# Patient Record
Sex: MALE | Race: WHITE | NOT HISPANIC OR LATINO | Employment: FULL TIME | URBAN - METROPOLITAN AREA
[De-identification: names, ages, dates, MRNs, and addresses within clinical notes are randomized per-mention and may not be internally consistent; named-entity substitution may affect disease eponyms.]

---

## 2023-02-15 ENCOUNTER — HOSPITAL ENCOUNTER (EMERGENCY)
Facility: HOSPITAL | Age: 53
Discharge: HOME | End: 2023-02-15
Attending: EMERGENCY MEDICINE | Admitting: EMERGENCY MEDICINE

## 2023-02-15 ENCOUNTER — APPOINTMENT (OUTPATIENT)
Dept: GENERAL RADIOLOGY | Facility: HOSPITAL | Age: 53
End: 2023-02-15

## 2023-02-15 VITALS
WEIGHT: 210 LBS | BODY MASS INDEX: 28.44 KG/M2 | SYSTOLIC BLOOD PRESSURE: 128 MMHG | RESPIRATION RATE: 16 BRPM | DIASTOLIC BLOOD PRESSURE: 76 MMHG | TEMPERATURE: 98.7 F | OXYGEN SATURATION: 94 % | HEART RATE: 65 BPM | HEIGHT: 72 IN

## 2023-02-15 DIAGNOSIS — R07.9 ACUTE CHEST PAIN: Primary | ICD-10-CM

## 2023-02-15 DIAGNOSIS — Z79.890 LONG-TERM CURRENT USE OF TESTOSTERONE REPLACEMENT THERAPY: ICD-10-CM

## 2023-02-15 DIAGNOSIS — R42 LIGHTHEADEDNESS: ICD-10-CM

## 2023-02-15 DIAGNOSIS — F17.200 SMOKER: ICD-10-CM

## 2023-02-15 LAB
ALBUMIN SERPL-MCNC: 4.8 G/DL (ref 3.5–5.2)
ALBUMIN/GLOB SERPL: 2.5 G/DL
ALP SERPL-CCNC: 78 U/L (ref 39–117)
ALT SERPL W P-5'-P-CCNC: 15 U/L (ref 1–41)
ANION GAP SERPL CALCULATED.3IONS-SCNC: 9 MMOL/L (ref 5–15)
AST SERPL-CCNC: 21 U/L (ref 1–40)
BASOPHILS # BLD AUTO: 0.03 10*3/MM3 (ref 0–0.2)
BASOPHILS NFR BLD AUTO: 0.3 % (ref 0–1.5)
BILIRUB SERPL-MCNC: 0.4 MG/DL (ref 0–1.2)
BILIRUB UR QL STRIP: NEGATIVE
BUN SERPL-MCNC: 16 MG/DL (ref 6–20)
BUN/CREAT SERPL: 21.9 (ref 7–25)
CALCIUM SPEC-SCNC: 9.5 MG/DL (ref 8.6–10.5)
CHLORIDE SERPL-SCNC: 105 MMOL/L (ref 98–107)
CLARITY UR: CLEAR
CO2 SERPL-SCNC: 27 MMOL/L (ref 22–29)
COLOR UR: YELLOW
CREAT SERPL-MCNC: 0.73 MG/DL (ref 0.76–1.27)
DEPRECATED RDW RBC AUTO: 41.7 FL (ref 37–54)
EGFRCR SERPLBLD CKD-EPI 2021: 108.8 ML/MIN/1.73
EOSINOPHIL # BLD AUTO: 0.11 10*3/MM3 (ref 0–0.4)
EOSINOPHIL NFR BLD AUTO: 1.2 % (ref 0.3–6.2)
ERYTHROCYTE [DISTWIDTH] IN BLOOD BY AUTOMATED COUNT: 12.8 % (ref 12.3–15.4)
GEN 5 2HR TROPONIN T REFLEX: 7 NG/L
GLOBULIN UR ELPH-MCNC: 1.9 GM/DL
GLUCOSE SERPL-MCNC: 77 MG/DL (ref 65–99)
GLUCOSE UR STRIP-MCNC: NEGATIVE MG/DL
HCT VFR BLD AUTO: 45 % (ref 37.5–51)
HGB BLD-MCNC: 15.4 G/DL (ref 13–17.7)
HGB UR QL STRIP.AUTO: NEGATIVE
HOLD SPECIMEN: NORMAL
IMM GRANULOCYTES # BLD AUTO: 0.04 10*3/MM3 (ref 0–0.05)
IMM GRANULOCYTES NFR BLD AUTO: 0.4 % (ref 0–0.5)
KETONES UR QL STRIP: NEGATIVE
LEUKOCYTE ESTERASE UR QL STRIP.AUTO: NEGATIVE
LYMPHOCYTES # BLD AUTO: 1.72 10*3/MM3 (ref 0.7–3.1)
LYMPHOCYTES NFR BLD AUTO: 18.7 % (ref 19.6–45.3)
MAGNESIUM SERPL-MCNC: 2.2 MG/DL (ref 1.6–2.6)
MCH RBC QN AUTO: 30.9 PG (ref 26.6–33)
MCHC RBC AUTO-ENTMCNC: 34.2 G/DL (ref 31.5–35.7)
MCV RBC AUTO: 90.2 FL (ref 79–97)
MONOCYTES # BLD AUTO: 0.82 10*3/MM3 (ref 0.1–0.9)
MONOCYTES NFR BLD AUTO: 8.9 % (ref 5–12)
NEUTROPHILS NFR BLD AUTO: 6.46 10*3/MM3 (ref 1.7–7)
NEUTROPHILS NFR BLD AUTO: 70.5 % (ref 42.7–76)
NITRITE UR QL STRIP: NEGATIVE
NRBC BLD AUTO-RTO: 0 /100 WBC (ref 0–0.2)
PH UR STRIP.AUTO: 7.5 [PH] (ref 5–8)
PLATELET # BLD AUTO: 250 10*3/MM3 (ref 140–450)
PMV BLD AUTO: 9.8 FL (ref 6–12)
POTASSIUM SERPL-SCNC: 4.4 MMOL/L (ref 3.5–5.2)
PROT SERPL-MCNC: 6.7 G/DL (ref 6–8.5)
PROT UR QL STRIP: NEGATIVE
QT INTERVAL: 334 MS
QT INTERVAL: 376 MS
QTC INTERVAL: 391 MS
QTC INTERVAL: 397 MS
RBC # BLD AUTO: 4.99 10*6/MM3 (ref 4.14–5.8)
SODIUM SERPL-SCNC: 141 MMOL/L (ref 136–145)
SP GR UR STRIP: 1.01 (ref 1–1.03)
TROPONIN T DELTA: -5 NG/L
TROPONIN T SERPL HS-MCNC: 12 NG/L
UROBILINOGEN UR QL STRIP: NORMAL
WBC NRBC COR # BLD: 9.18 10*3/MM3 (ref 3.4–10.8)
WHOLE BLOOD HOLD COAG: NORMAL
WHOLE BLOOD HOLD SPECIMEN: NORMAL

## 2023-02-15 PROCEDURE — 80053 COMPREHEN METABOLIC PANEL: CPT | Performed by: EMERGENCY MEDICINE

## 2023-02-15 PROCEDURE — 99284 EMERGENCY DEPT VISIT MOD MDM: CPT

## 2023-02-15 PROCEDURE — 83735 ASSAY OF MAGNESIUM: CPT | Performed by: EMERGENCY MEDICINE

## 2023-02-15 PROCEDURE — 93005 ELECTROCARDIOGRAM TRACING: CPT

## 2023-02-15 PROCEDURE — 81003 URINALYSIS AUTO W/O SCOPE: CPT | Performed by: EMERGENCY MEDICINE

## 2023-02-15 PROCEDURE — 93005 ELECTROCARDIOGRAM TRACING: CPT | Performed by: EMERGENCY MEDICINE

## 2023-02-15 PROCEDURE — 36415 COLL VENOUS BLD VENIPUNCTURE: CPT

## 2023-02-15 PROCEDURE — 71045 X-RAY EXAM CHEST 1 VIEW: CPT

## 2023-02-15 PROCEDURE — 85025 COMPLETE CBC W/AUTO DIFF WBC: CPT | Performed by: EMERGENCY MEDICINE

## 2023-02-15 PROCEDURE — 84484 ASSAY OF TROPONIN QUANT: CPT | Performed by: EMERGENCY MEDICINE

## 2023-02-15 RX ORDER — SODIUM CHLORIDE 0.9 % (FLUSH) 0.9 %
10 SYRINGE (ML) INJECTION AS NEEDED
Status: DISCONTINUED | OUTPATIENT
Start: 2023-02-15 | End: 2023-02-15 | Stop reason: HOSPADM

## 2023-02-15 NOTE — ED PROVIDER NOTES
[place]    EMERGENCY DEPARTMENT ENCOUNTER      Pt Name: Karen Reza  MRN: [id number]  YOB: 1970  Date of evaluation: 2/15/2023  Provider: Jah Stone MD    CHIEF COMPLAINT       Chief Complaint   Patient presents with   • Chest Pain   • Dizziness         HISTORY OF PRESENT ILLNESS   Karen Reza is a 52 y.o. male who presents to the emergency department with intermittent nonradiating substernal burning without any nausea, vomiting, or diaphoresis over the course the past couple days.  Patient did feel lightheaded at 1 point yesterday while he was at work.  He denies any shortness of breath, cough, fever, chills, abdominal pain, vomiting, or diarrhea.  He has no significant history of cardiovascular disease.  He is on TRT and had some lab work performed at their clinic that did show some high triglycerides and abnormal lipid profile.  He has no prior history of PE.      Nursing notes were reviewed.    REVIEW OF SYSTEMS     ROS:  A chief complaint appropriate review of systems was completed and is negative except as noted in the HPI.      PAST MEDICAL HISTORY   History reviewed. No pertinent past medical history.      SURGICAL HISTORY     History reviewed. No pertinent surgical history.      CURRENT MEDICATIONS       Current Facility-Administered Medications:   •  sodium chloride 0.9 % flush 10 mL, 10 mL, Intravenous, PRN, Jah Stone MD    Current Outpatient Medications:   •  TESTOSTERONE CYPIONATE IJ, Inject 0.5 mL as directed Every 3 (Three) Days., Disp: , Rfl:     ALLERGIES     Patient has no known allergies.    FAMILY HISTORY     History reviewed. No pertinent family history.       SOCIAL HISTORY       Social History     Socioeconomic History   • Marital status: Single   Tobacco Use   • Smoking status: Every Day     Types: Cigarettes   • Smokeless tobacco: Never   Vaping Use   • Vaping Use: Never used   Substance and Sexual Activity   • Alcohol use: Not Currently   • Drug use: Defer  "  • Sexual activity: Defer         PHYSICAL EXAM    (up to 7 for level 4, 8 or more for level 5)     Vitals:    02/15/23 1127 02/15/23 1203 02/15/23 1230   BP: 147/84 165/97 143/76   BP Location: Left arm  Left arm   Patient Position: Sitting  Sitting   Pulse: 87 92 71   Resp: 16     Temp: 98.7 °F (37.1 °C)     TempSrc: Oral     SpO2: 95% 95% 95%   Weight: 95.3 kg (210 lb)     Height: 182.9 cm (72\")         General: Awake, alert, no acute distress.  HEENT: Conjunctivae normal.  Neck: Trachea midline.  Cardiac: Heart regular rate, rhythm, no murmurs, rubs, or gallops  Lungs: Lungs are clear to auscultation, there is no wheezing, rhonchi, or rales. There is no use of accessory muscles.  Chest wall: There is no tenderness to palpation over the chest wall or over ribs  Abdomen: Abdomen is soft, nontender, nondistended. There are no firm or pulsatile masses, no rebound rigidity or guarding.   Musculoskeletal: No deformity.  Neuro: Alert and oriented x 4.  Dermatology: Skin is warm and dry  Psych: Mentation is grossly normal, cognition is grossly normal. Affect is appropriate.        DIAGNOSTIC RESULTS     EKG: All EKGs are interpreted by the Emergency Department Physician who either signs or Co-signs this chart in the absence of a cardiologist.    ECG 12 Lead Chest Pain   Final Result   Test Reason : Chest Pain   Blood Pressure :   */*   mmHG   Vent. Rate :  65 BPM     Atrial Rate :  65 BPM      P-R Int : 138 ms          QRS Dur :  88 ms       QT Int : 376 ms       P-R-T Axes :  60  49  23 degrees      QTc Int : 391 ms      Normal sinus rhythm   Normal ECG   When compared with ECG of 15-FEB-2023 11:33,   No significant change was found   Confirmed by PHI RODRIGEZ ([id number]) on 2/15/2023 2:47:54 PM      Referred By: EDMD           Confirmed By: PHI RODRIGEZ      ECG 12 Lead ED Triage Standing Order; Weak / Dizzy / AMS   Final Result   Test Reason : ED Triage Standing Order~   Blood Pressure :   */*   mmHG   Vent. Rate :  " 85 BPM     Atrial Rate :  85 BPM      P-R Int : 132 ms          QRS Dur :  90 ms       QT Int : 334 ms       P-R-T Axes :  70  65  24 degrees      QTc Int : 397 ms      Normal sinus rhythm   Normal ECG   No previous ECGs available   Confirmed by PHI RODRIGEZ ([id number]) on 2/15/2023 11:38:51 AM      Referred By: DALIA           Confirmed By: PHI RODRIGEZ            RADIOLOGY:   [x] Radiologist's Report Reviewed:  XR Chest 1 View   Final Result   Impression:   No acute cardiopulmonary process. A questionable nodule is present within the right lower lobe. CT of the chest is recommended for further characterization.      Electronically Signed: Lorna Birmingham     2/15/2023 12:03 PM EST     Workstation ID: [id number]          I ordered and independently reviewed the above noted radiographic studies.        LABS:    I have reviewed and interpreted all of the currently available lab results from this visit (if applicable):  Results for orders placed or performed during the hospital encounter of 02/15/23   Comprehensive Metabolic Panel    Specimen: Blood   Result Value Ref Range    Glucose 77 65 - 99 mg/dL    BUN 16 6 - 20 mg/dL    Creatinine 0.73 (L) 0.76 - 1.27 mg/dL    Sodium 141 136 - 145 mmol/L    Potassium 4.4 3.5 - 5.2 mmol/L    Chloride 105 98 - 107 mmol/L    CO2 27.0 22.0 - 29.0 mmol/L    Calcium 9.5 8.6 - 10.5 mg/dL    Total Protein 6.7 6.0 - 8.5 g/dL    Albumin 4.8 3.5 - 5.2 g/dL    ALT (SGPT) 15 1 - 41 U/L    AST (SGOT) 21 1 - 40 U/L    Alkaline Phosphatase 78 39 - 117 U/L    Total Bilirubin 0.4 0.0 - 1.2 mg/dL    Globulin 1.9 gm/dL    A/G Ratio 2.5 g/dL    BUN/Creatinine Ratio 21.9 7.0 - 25.0    Anion Gap 9.0 5.0 - 15.0 mmol/L    eGFR 108.8 >60.0 mL/min/1.73   High Sensitivity Troponin T    Specimen: Blood   Result Value Ref Range    HS Troponin T 12 <15 ng/L   Magnesium    Specimen: Blood   Result Value Ref Range    Magnesium 2.2 1.6 - 2.6 mg/dL   Urinalysis With Microscopic If Indicated (No Culture) - Urine, Clean  Catch    Specimen: Urine, Clean Catch   Result Value Ref Range    Color, UA Yellow Yellow, Straw    Appearance, UA Clear Clear    pH, UA 7.5 5.0 - 8.0    Specific Gravity, UA 1.011 1.001 - 1.030    Glucose, UA Negative Negative    Ketones, UA Negative Negative    Bilirubin, UA Negative Negative    Blood, UA Negative Negative    Protein, UA Negative Negative    Leuk Esterase, UA Negative Negative    Nitrite, UA Negative Negative    Urobilinogen, UA 0.2 E.U./dL 0.2 - 1.0 E.U./dL   CBC Auto Differential    Specimen: Blood   Result Value Ref Range    WBC 9.18 3.40 - 10.80 10*3/mm3    RBC 4.99 4.14 - 5.80 10*6/mm3    Hemoglobin 15.4 13.0 - 17.7 g/dL    Hematocrit 45.0 37.5 - 51.0 %    MCV 90.2 79.0 - 97.0 fL    MCH 30.9 26.6 - 33.0 pg    MCHC 34.2 31.5 - 35.7 g/dL    RDW 12.8 12.3 - 15.4 %    RDW-SD 41.7 37.0 - 54.0 fl    MPV 9.8 6.0 - 12.0 fL    Platelets 250 140 - 450 10*3/mm3    Neutrophil % 70.5 42.7 - 76.0 %    Lymphocyte % 18.7 (L) 19.6 - 45.3 %    Monocyte % 8.9 5.0 - 12.0 %    Eosinophil % 1.2 0.3 - 6.2 %    Basophil % 0.3 0.0 - 1.5 %    Immature Grans % 0.4 0.0 - 0.5 %    Neutrophils, Absolute 6.46 1.70 - 7.00 10*3/mm3    Lymphocytes, Absolute 1.72 0.70 - 3.10 10*3/mm3    Monocytes, Absolute 0.82 0.10 - 0.90 10*3/mm3    Eosinophils, Absolute 0.11 0.00 - 0.40 10*3/mm3    Basophils, Absolute 0.03 0.00 - 0.20 10*3/mm3    Immature Grans, Absolute 0.04 0.00 - 0.05 10*3/mm3    nRBC 0.0 0.0 - 0.2 /100 WBC   High Sensitivity Troponin T 2Hr    Specimen: Blood   Result Value Ref Range    HS Troponin T 7 <15 ng/L    Troponin T Delta -5 (L) >=-4 - <+4 ng/L   ECG 12 Lead ED Triage Standing Order; Weak / Dizzy / AMS   Result Value Ref Range    QT Interval 334 ms    QTC Interval 397 ms   ECG 12 Lead Chest Pain   Result Value Ref Range    QT Interval 376 ms    QTC Interval 391 ms   Green Top (Gel)   Result Value Ref Range    Extra Tube Hold for add-ons.    Lavender Top   Result Value Ref Range    Extra Tube hold for add-on     Gold Top - SST   Result Value Ref Range    Extra Tube Hold for add-ons.    Light Blue Top   Result Value Ref Range    Extra Tube Hold for add-ons.         If labs were ordered, I independently reviewed the results and considered them in treating the patient.      EMERGENCY DEPARTMENT COURSE and DIFFERENTIAL DIAGNOSIS/MDM:   Vitals:  AS OF 15:02 EST    BP - 143/76  HR - 71  TEMP - 98.7 °F (37.1 °C) (Oral)  O2 SATS - 95%        Discussion below represents my analysis of pertinent findings related to patient's condition, differential diagnosis, treatment plan and final disposition.      Differential diagnosis:  The differential diagnosis associated with the patient's presentation includes: Unstable angina, non-STEMI, nonspecific chest pain thorax, PE, dissection, anemia, electrolyte derangement       Independent interpretations (ECG/rhythm strip/X-ray/US/CT scan): independently reviewed the patient's cardiac monitoring which demonstrates sinus rhythm without evidence of dysrhythmia.  I independently reviewed the patient's chest x-ray which demonstrates no evidence of pulmonary infiltrate or pneumothorax.      Additional sources:  Discussed/obtained information from independent historians:   [] Spouse:   [] Parent:   [] Friend:   [] EMS:   [] Other:  External (non-ED) record review:   [] Inpatient record:   [] Office record:   [] Outpatient record:   [x] Prior Outpatient labs: I reviewed labs provided to me the patient from his TRT clinic.  Evidence of hypertriglyceridemia and low HDL cholesterol.   [] Prior Outpatient radiology:   [] Primary Care record:   [] Outside ED record:   [] Other:       Patient's care impacted by:   [] Diabetes   [] Hypertension   [] Coronary Artery Disease   [] Cancer   [x] Other: Smoker    Care significantly affected by Social Determinants of Health (housing and economic circumstances, unemployment)    [] Yes     [x] No   If yes, Patient's care significantly limited by  Social Determinants  of Health including:    [] Inadequate housing    [] Low income    [] Alcoholism and drug addiction in family    [] Problems related to primary support group    [] Unemployment    [] Problems related to employment    [] Other Social Determinants of Health:       Consideration of admission/observation vs discharge: I did consider admission for this patient, however he has been asymptomatic during the course of his stay in the ED.  He is also low risk in regards to this chest discomfort and there is no evidence of emergent pathology contributing to his symptoms.  Given this, feel he is most appropriate discharge home with close outpatient follow-up.    HEART Score for Major Cardiac Events from [email or web address]  on 2/15/2023  ** All calculations should be rechecked by clinician prior to use **    RESULT SUMMARY:  2 points  Low Score (0-3 points)    Risk of MACE of 0.9-1.7%.      INPUTS:  History --> 0 = Slightly suspicious  EKG --> 0 = Normal  Age --> 1 = 45-64  Risk factors --> 1 = 1-2 risk factors  Initial troponin --> 0 = ?normal limit      Additional orders considered but not ordered:  The following testing was considered but ultimately not selected after discussion with patient/family: I considered ordering D-dimer or CTA to rule PE, however the patient has no ongoing symptoms at this time and has not had any dyspnea.  Along with this, there is no tachycardia or hypoxia and feel that the risk for this was very low.    ED Course:    ED Course as of 02/15/23 1502   Wed Feb 15, 2023   1455 On reexamination, the patient is asleep and is completely asymptomatic.  No emergent cause for his lightheadedness and chest discomfort yesterday could be identified.  He has had no chest discomfort and no symptoms during the course of his stay in the ED today.  Low risk and serially negative ECGs and high-sensitivity troponins and feel this is very unlikely be cardiac in nature.  He has no ongoing chest pain, dyspnea, tachycardia,  hypoxia and no suggestion of pulmonary embolus or underlying neurovascular emergency.  Labs demonstrate no evidence of significant anemia or electrolyte derangement.  Hyperlipidemia noted on TRT clinic labs.  Will refer to primary care provider for further evaluation of this. [NS]      ED Course User Index  [NS] Jah Stone MD             I had a discussion with the patient/family regarding diagnosis, diagnostic results, treatment plan, and medications.  The patient/family indicated understanding of these instructions.  I spent adequate time at the bedside preceding discharge necessary to personally discuss the aftercare instructions, giving patient education, providing explanations of the results of our evaluations/findings, and my decision making to assure that the patient/family understand the plan of care.  Time was allotted to answer questions at that time and throughout the ED course.  Emphasis was placed on timely follow-up after discharge.  I also discussed the potential for the development of an acute emergent condition requiring further evaluation, admission, or even surgical intervention. I discussed that we found nothing during the visit today indicating the need for further workup, admission, or the presence of an unstable medical condition.  I encouraged the patient to return to the emergency department immediately for ANY concerns, worsening, new complaints, or if symptoms persist and unable to seek follow-up in a timely fashion.  The patient/family expressed understanding and agreement with this plan.  The patient will follow-up with their PCP in 1-2 days for reevaluation.           PROCEDURES:  Procedures    CRITICAL CARE TIME        FINAL IMPRESSION      1. Acute chest pain    2. Lightheadedness    3. Long-term current use of testosterone replacement therapy    4. Smoker          DISPOSITION/PLAN     ED Disposition     ED Disposition   Discharge    Condition   Stable    Comment   --                Comment: Please note this report has been produced using speech recognition software.      Jah Stone MD  Attending Emergency Physician           Jah Stone MD  02/15/23 [id number]